# Patient Record
Sex: MALE | Race: WHITE | NOT HISPANIC OR LATINO | Employment: FULL TIME | URBAN - NONMETROPOLITAN AREA
[De-identification: names, ages, dates, MRNs, and addresses within clinical notes are randomized per-mention and may not be internally consistent; named-entity substitution may affect disease eponyms.]

---

## 2024-08-10 ENCOUNTER — HOSPITAL ENCOUNTER (EMERGENCY)
Facility: HOSPITAL | Age: 34
Discharge: HOME | End: 2024-08-10
Payer: COMMERCIAL

## 2024-08-10 VITALS
WEIGHT: 214.73 LBS | RESPIRATION RATE: 20 BRPM | TEMPERATURE: 97.6 F | HEART RATE: 62 BPM | SYSTOLIC BLOOD PRESSURE: 138 MMHG | OXYGEN SATURATION: 100 % | DIASTOLIC BLOOD PRESSURE: 76 MMHG

## 2024-08-10 DIAGNOSIS — S05.02XA ABRASION OF LEFT CORNEA, INITIAL ENCOUNTER: Primary | ICD-10-CM

## 2024-08-10 PROCEDURE — 99283 EMERGENCY DEPT VISIT LOW MDM: CPT

## 2024-08-10 PROCEDURE — 99284 EMERGENCY DEPT VISIT MOD MDM: CPT | Performed by: PHYSICIAN ASSISTANT

## 2024-08-10 RX ORDER — TETRACAINE HYDROCHLORIDE 5 MG/ML
1 SOLUTION OPHTHALMIC ONCE
Status: COMPLETED | OUTPATIENT
Start: 2024-08-10 | End: 2024-08-10

## 2024-08-10 RX ORDER — OFLOXACIN 3 MG/ML
1 SOLUTION/ DROPS OPHTHALMIC ONCE
Status: COMPLETED | OUTPATIENT
Start: 2024-08-10 | End: 2024-08-10

## 2024-08-10 RX ADMIN — TETRACAINE HYDROCHLORIDE 1 DROP: 5 SOLUTION OPHTHALMIC at 18:25

## 2024-08-10 RX ADMIN — OFLOXACIN 1 DROP: 3 SOLUTION/ DROPS OPHTHALMIC at 18:42

## 2024-08-10 RX ADMIN — FLUORESCEIN SODIUM 1 STRIP: 1 STRIP OPHTHALMIC at 18:26

## 2024-08-10 NOTE — DISCHARGE INSTRUCTIONS
These use eyedrops, ofloxacin 1 to 2 drops in the left eye 4 times a day for the next 5 days.  Please follow-up with an eye doctor when you return home.  Please return to the emergency department any new or worsening symptoms.

## 2024-08-10 NOTE — ED PROVIDER NOTES
History  Chief Complaint   Patient presents with    Eye Problem     Pt states left eye was scratched by nephew. Denies changes to vision, just irritated     34-year-old male presents to the emergency department for evaluation of left eye irritation.  He reports he was playing in the pool with his nephew when he was accidentally scratched in the eye.  He denies any visual changes.  States the eye is watering and feels irritated.  Denies any contact use.  States he occasionally uses glasses for far distance.  Has not followed with eye specialist regularly.  Is from [place] and visiting the family for the weekend. Returning back to [place] tomorrow.         None       History reviewed. No pertinent past medical history.    History reviewed. No pertinent surgical history.    History reviewed. No pertinent family history.  I have reviewed and agree with the history as documented.    E-Cigarette/Vaping     E-Cigarette/Vaping Substances     Social History     Tobacco Use    Smoking status: Never    Smokeless tobacco: Never   Substance Use Topics    Alcohol use: Never    Drug use: Never       Review of Systems   Eyes:  Positive for pain, discharge and redness. Negative for photophobia, itching and visual disturbance.   Respiratory: Negative.     Cardiovascular: Negative.    Gastrointestinal: Negative.    Musculoskeletal: Negative.    Skin: Negative.    Neurological: Negative.    All other systems reviewed and are negative.      Physical Exam  Physical Exam  Vitals and nursing note reviewed.   Constitutional:       General: He is not in acute distress.     Appearance: Normal appearance. He is not ill-appearing, toxic-appearing or diaphoretic.   HENT:      Head: Normocephalic.      Nose: Nose normal.   Eyes:      General: Lids are normal. Lids are everted, no foreign bodies appreciated.      Pupils: Pupils are equal, round, and reactive to light.      Comments: Woods lamp exam reveals 1-year-old abrasion to the left eye at 12:00  over the iris.  No corneal ulcer or corneal flare noted.  No visualized foreign bodies.   Pulmonary:      Effort: Pulmonary effort is normal.   Skin:     General: Skin is warm and dry.   Neurological:      General: No focal deficit present.      Mental Status: He is alert.         Vital Signs  ED Triage Vitals [08/10/24 1812]   Temperature Pulse Respirations Blood Pressure SpO2   97.6 °F (36.4 °C) 65 20 140/81 100 %      Temp src Heart Rate Source Patient Position - Orthostatic VS BP Location FiO2 (%)   -- -- -- -- --      Pain Score       1           Vitals:    08/10/24 1812 08/10/24 1815   BP: 140/81 138/76   Pulse: 65 62         Visual Acuity      ED Medications  Medications   ofloxacin (OCUFLOX) 0.3 % ophthalmic solution 1 drop (has no administration in time range)   tetracaine 0.5 % ophthalmic solution 1 drop (1 drop Left Eye Given 8/10/24 1825)   fluorescein sodium sterile ophthalmic strip 1 strip (1 strip Left Eye Given 8/10/24 1826)       Diagnostic Studies  Results Reviewed       None                   No orders to display              Procedures  Procedures         ED Course             SBIRT 20yo+      Flowsheet Row Most Recent Value   Initial Alcohol Screen: US AUDIT-C     1. How often do you have a drink containing alcohol? 0 Filed at: 08/10/2024 1813   2. How many drinks containing alcohol do you have on a typical day you are drinking?  0 Filed at: 08/10/2024 1813   3a. Male UNDER 65: How often do you have five or more drinks on one occasion? 0 Filed at: 08/10/2024 1813   Audit-C Score 0 Filed at: 08/10/2024 1813   MATTHEW: How many times in the past year have you...    Used an illegal drug or used a prescription medication for non-medical reasons? Never Filed at: 08/10/2024 1813                      Medical Decision Making  34-year-old male presents to the emergency department for evaluation of left eye irritation status post being scratched in the eye by nephew.  Vitals and medical record reviewed.   Patient at risk of lung but not limited to corneal abrasion, corneal ulcer, corneal flare, foreign body.  Woods lamp exam performed.  Evidence of corneal abrasion which is consistent with patient's history.  Was started on ofloxacin drops will follow-up with ophthalmology when he returns home to [place].  Return precautions were discussed and he verbalized understanding.  He was clinically and hemodynamically stable for discharge    Risk  Prescription drug management.                 Disposition  Final diagnoses:   Abrasion of left cornea, initial encounter     Time reflects when diagnosis was documented in both MDM as applicable and the Disposition within this note       Time User Action Codes Description Comment    8/10/2024  6:33 PM Skylar Garcia Add [S05.02XA] Abrasion of left cornea, initial encounter           ED Disposition       ED Disposition   Discharge    Condition   Stable    Date/Time   Sat Aug 10, 2024 1833    Comment   Endy Garrett discharge to home/self care.                   Follow-up Information       Follow up With Specialties Details Why Contact Info    [facility]   [address] [place] [address] [id number] [phone]              Patient's Medications    No medications on file       No discharge procedures on file.    PDMP Review       None            ED Provider  Electronically Signed by             Skylar Garcia PA-C  08/10/24 3147